# Patient Record
Sex: FEMALE | Race: WHITE | NOT HISPANIC OR LATINO | URBAN - METROPOLITAN AREA
[De-identification: names, ages, dates, MRNs, and addresses within clinical notes are randomized per-mention and may not be internally consistent; named-entity substitution may affect disease eponyms.]

---
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
## 2023-04-26 ENCOUNTER — APPOINTMENT (OUTPATIENT)
Dept: URGENT CARE | Facility: CLINIC | Age: 23
End: 2023-04-26

## 2025-03-12 ENCOUNTER — OFFICE VISIT (OUTPATIENT)
Dept: URGENT CARE | Facility: CLINIC | Age: 25
End: 2025-03-12
Payer: COMMERCIAL

## 2025-03-12 VITALS
WEIGHT: 134 LBS | RESPIRATION RATE: 18 BRPM | SYSTOLIC BLOOD PRESSURE: 108 MMHG | HEART RATE: 82 BPM | HEIGHT: 66 IN | BODY MASS INDEX: 21.53 KG/M2 | OXYGEN SATURATION: 98 % | TEMPERATURE: 98.3 F | DIASTOLIC BLOOD PRESSURE: 72 MMHG

## 2025-03-12 DIAGNOSIS — J02.9 PHARYNGITIS, UNSPECIFIED ETIOLOGY: ICD-10-CM

## 2025-03-12 LAB — S PYO DNA SPEC NAA+PROBE: NOT DETECTED

## 2025-03-12 PROCEDURE — 87651 STREP A DNA AMP PROBE: CPT

## 2025-03-12 PROCEDURE — 3078F DIAST BP <80 MM HG: CPT

## 2025-03-12 PROCEDURE — 99213 OFFICE O/P EST LOW 20 MIN: CPT

## 2025-03-12 PROCEDURE — 3074F SYST BP LT 130 MM HG: CPT

## 2025-03-12 ASSESSMENT — ENCOUNTER SYMPTOMS
FEVER: 0
EYE DISCHARGE: 0
WEAKNESS: 0
SORE THROAT: 1
MYALGIAS: 0
HEADACHES: 0
DIAPHORESIS: 0
DIARRHEA: 0
DIZZINESS: 0
SHORTNESS OF BREATH: 0
NAUSEA: 0
SINUS PAIN: 0
BLOOD IN STOOL: 0
CHILLS: 0
SPUTUM PRODUCTION: 0
WHEEZING: 0
VOMITING: 0
BLURRED VISION: 0
COUGH: 0
PSYCHIATRIC NEGATIVE: 1
ABDOMINAL PAIN: 0

## 2025-03-12 NOTE — PATIENT INSTRUCTIONS
-A cough can persist for up to 6 weeks.  -Increase fluids.  -Eat whole foods that are high in vitamins and minerals to aid in healing.  -REST! REST! Rest! Let your body rest so it can heal.   -Cool-mist humidifier for nighttime use.   -Practice good hand hygiene.  -You may use either acetaminophen or ibuprofen over-the-counter every 6-8 hours for pain or fever if that is not contraindicated with your body.   -Chloraseptic lozenge, warm tea with honey or throat spray to help with discomfort.   -Zinc 25 mg has been shown to be effective in reducing the duration of cold symptoms.  -Saline Nasal Spray and Flonase may be used for congestion. Nasal saline in the nose helps to help break up nasal secretions and is beneficial for nasal symptoms and throat pain.  -Saline Nasal Rinse with a Neti pot or Long med rinse can be used multiple times a day. Be sure to use distilled water or boil tap water for 10 mins. Add a saline packet. Be sure the water is not too hot (around your body temp of 98 degrees)  -Decongestants are not recommended as they can have a rebound congestion effect along with many side effects (especially if you have high blood pressure).   -Salt water gargles for sore throat several times a day.

## 2025-03-12 NOTE — LETTER
March 12, 2025         Patient: Maranda Khan   YOB: 2000   Date of Visit: 3/12/2025           To Whom it May Concern:    Maranda Khan was seen in my clinic on 3/12/2025. She may return to work on 3/14/25.    If you have any questions or concerns, please don't hesitate to call.        Sincerely,           JAMIL Rivas.  Electronically Signed

## 2025-03-12 NOTE — PROGRESS NOTES
"Subjective:   Maranda Khan is a 25 y.o. female who presents for Sore Throat (In contact with someone who had strep )      HPI  Patient was exposed to strep 5 days ago and now has sore throat for three days  Has tried tylenol with mild relief     Negative: muffled voice, drooling, post nasal drip, vision changes, recent travel         Review of Systems   Constitutional:  Negative for chills, diaphoresis, fever and malaise/fatigue.   HENT:  Positive for sore throat. Negative for congestion, ear pain and sinus pain.    Eyes:  Negative for blurred vision and discharge.   Respiratory:  Negative for cough, sputum production, shortness of breath and wheezing.    Cardiovascular:  Negative for chest pain.   Gastrointestinal:  Negative for abdominal pain, blood in stool, diarrhea, nausea and vomiting.   Genitourinary: Negative.    Musculoskeletal:  Negative for myalgias.   Skin:  Negative for rash.   Neurological:  Negative for dizziness, weakness and headaches.   Endo/Heme/Allergies: Negative.    Psychiatric/Behavioral: Negative.     All other systems reviewed and are negative.      Medical History:  History reviewed. No pertinent past medical history.    Allergies:  Allergies   Allergen Reactions    Amoxicillin     Nitrofurantoin Hives    Penicillins Hives, Itching, Rash and Swelling       Social history, surgical history, medications, and current problem list reviewed today in Epic.       Objective:     /72 (BP Location: Left arm, Patient Position: Sitting, BP Cuff Size: Adult)   Pulse 82   Temp 36.8 °C (98.3 °F) (Temporal)   Resp 18   Ht 1.676 m (5' 6\")   Wt 60.8 kg (134 lb)   SpO2 98%     Physical Exam  Vitals reviewed.   Constitutional:       General: She is not in acute distress.     Appearance: Normal appearance. She is not ill-appearing, toxic-appearing or diaphoretic.   HENT:      Head: Normocephalic.      Jaw: There is normal jaw occlusion.      Right Ear: Tympanic membrane, ear canal and external " ear normal.      Left Ear: Tympanic membrane, ear canal and external ear normal.      Nose: Nose normal. No congestion or rhinorrhea.      Right Sinus: No maxillary sinus tenderness or frontal sinus tenderness.      Left Sinus: No maxillary sinus tenderness or frontal sinus tenderness.      Mouth/Throat:      Lips: Pink.      Mouth: Mucous membranes are moist.      Tongue: Tongue does not deviate from midline.      Pharynx: Oropharynx is clear. Uvula midline. No oropharyngeal exudate, posterior oropharyngeal erythema or uvula swelling.   Eyes:      Extraocular Movements: Extraocular movements intact.      Conjunctiva/sclera: Conjunctivae normal.      Pupils: Pupils are equal, round, and reactive to light.   Cardiovascular:      Rate and Rhythm: Normal rate and regular rhythm.      Pulses: Normal pulses.      Heart sounds: Normal heart sounds.   Pulmonary:      Effort: Pulmonary effort is normal.      Breath sounds: Normal breath sounds.   Abdominal:      General: Abdomen is flat.      Palpations: Abdomen is soft.      Tenderness: There is no abdominal tenderness.   Musculoskeletal:         General: Normal range of motion.      Cervical back: Normal range of motion and neck supple.   Lymphadenopathy:      Cervical: No cervical adenopathy.   Skin:     General: Skin is warm.      Capillary Refill: Capillary refill takes less than 2 seconds.   Neurological:      General: No focal deficit present.      Mental Status: She is alert and oriented to person, place, and time.   Psychiatric:         Mood and Affect: Mood normal.         Behavior: Behavior normal.         Assessment/Plan:       Diagnosis and associated orders:     1. Pharyngitis, unspecified etiology  POCT CEPHEID GROUP A STREP - PCR           Comments/MDM:   I personally reviewed prior external notes and test results pertinent to today's visit as well as additional imaging and testing completed in clinic today.     Very pleasant 25-year-old afebrile,  hemodynamically stable, generally well-appearing female, presenting with complaints of sore throat and strep throat exposure.  Normal pulmonary exam, no concern for pneumonia or bronchitis.  Normal ENT exam  no concern for acute otitis media or bacterial sinus infection.  In clinic strep test was negative   They were encouraged to increase fluids and rest, cool-mist humidifier, saline nasal rinse with distilled water, cough/throat drops, salt water gargles, tylenol or ibuprofen for fever and/or bodyaches.  At this time I do not believe antibiotics would improve patient's symptoms.  They were encouraged to follow-up with the clinic in 48 hours for re-evaluation as needed.  They were given strict instructions to follow up with the emergency room if they develop worsening symptoms and they verbalized understanding.  Patient is clinically stable at today's acute urgent care visit. Vital signs are normal and reassuring.  No acute distress noted. Appropriate for outpatient management at this time. No red flag warnings noted.  Patient given strict instructions to follow up with emergency room if they develop any red flag warnings which were discussed in depth.  They verbalized understanding. Differential diagnosis, natural history, supportive care, and indications for immediate follow-up discussed. All questions answered. They agree with the plan of care.      Please note that this dictation was created using voice recognition software. I have made every reasonable attempt to correct obvious errors, but I expect that there are errors of grammar and possibly content that I did not discover before finalizing the note.

## 2025-06-26 ENCOUNTER — HOSPITAL ENCOUNTER (OUTPATIENT)
Facility: MEDICAL CENTER | Age: 25
End: 2025-06-26
Payer: COMMERCIAL

## 2025-06-26 ENCOUNTER — OFFICE VISIT (OUTPATIENT)
Dept: URGENT CARE | Facility: PHYSICIAN GROUP | Age: 25
End: 2025-06-26
Payer: COMMERCIAL

## 2025-06-26 VITALS
BODY MASS INDEX: 23.24 KG/M2 | TEMPERATURE: 99 F | SYSTOLIC BLOOD PRESSURE: 92 MMHG | DIASTOLIC BLOOD PRESSURE: 50 MMHG | RESPIRATION RATE: 12 BRPM | HEART RATE: 77 BPM | WEIGHT: 144 LBS | OXYGEN SATURATION: 99 %

## 2025-06-26 DIAGNOSIS — R10.2 SUPRAPUBIC PAIN, ACUTE: ICD-10-CM

## 2025-06-26 DIAGNOSIS — N76.0 ACUTE VAGINITIS: ICD-10-CM

## 2025-06-26 DIAGNOSIS — N76.0 GARDNERELLA VAGINALIS INFECTION: Primary | ICD-10-CM

## 2025-06-26 DIAGNOSIS — B96.89 GARDNERELLA VAGINALIS INFECTION: Primary | ICD-10-CM

## 2025-06-26 LAB
APPEARANCE UR: ABNORMAL
BILIRUB UR STRIP-MCNC: ABNORMAL MG/DL
CANDIDA DNA VAG QL PROBE+SIG AMP: NEGATIVE
COLOR UR AUTO: YELLOW
G VAGINALIS DNA VAG QL PROBE+SIG AMP: POSITIVE
GLUCOSE UR STRIP.AUTO-MCNC: ABNORMAL MG/DL
KETONES UR STRIP.AUTO-MCNC: ABNORMAL MG/DL
LEUKOCYTE ESTERASE UR QL STRIP.AUTO: ABNORMAL
NITRITE UR QL STRIP.AUTO: ABNORMAL
PH UR STRIP.AUTO: 8.5 [PH] (ref 5–8)
POCT INT CON NEG: NEGATIVE
POCT INT CON POS: POSITIVE
POCT URINE PREGNANCY TEST: NEGATIVE
PROT UR QL STRIP: ABNORMAL MG/DL
RBC UR QL AUTO: ABNORMAL
SP GR UR STRIP.AUTO: 1.01
T VAGINALIS DNA VAG QL PROBE+SIG AMP: NEGATIVE
UROBILINOGEN UR STRIP-MCNC: 0.2 MG/DL

## 2025-06-26 PROCEDURE — 81002 URINALYSIS NONAUTO W/O SCOPE: CPT

## 2025-06-26 PROCEDURE — 87480 CANDIDA DNA DIR PROBE: CPT

## 2025-06-26 PROCEDURE — 87591 N.GONORRHOEAE DNA AMP PROB: CPT

## 2025-06-26 PROCEDURE — 87491 CHLMYD TRACH DNA AMP PROBE: CPT

## 2025-06-26 PROCEDURE — 87077 CULTURE AEROBIC IDENTIFY: CPT

## 2025-06-26 PROCEDURE — 87510 GARDNER VAG DNA DIR PROBE: CPT

## 2025-06-26 PROCEDURE — 3078F DIAST BP <80 MM HG: CPT

## 2025-06-26 PROCEDURE — 87086 URINE CULTURE/COLONY COUNT: CPT

## 2025-06-26 PROCEDURE — 87660 TRICHOMONAS VAGIN DIR PROBE: CPT

## 2025-06-26 PROCEDURE — 81025 URINE PREGNANCY TEST: CPT

## 2025-06-26 PROCEDURE — 99214 OFFICE O/P EST MOD 30 MIN: CPT

## 2025-06-26 PROCEDURE — 3074F SYST BP LT 130 MM HG: CPT

## 2025-06-26 ASSESSMENT — ENCOUNTER SYMPTOMS
ABDOMINAL PAIN: 1
CARDIOVASCULAR NEGATIVE: 1
RESPIRATORY NEGATIVE: 1
MYALGIAS: 0
FLANK PAIN: 0
FEVER: 0
CHILLS: 0

## 2025-06-26 NOTE — PROGRESS NOTES
Subjective:   Chief Complaint  Maranda Khan is a 25 y.o. female who presents for Vaginitis (Started 10 days ago) and Abdominal Pain      History of Present Illness  Patient presents with complaints of suprapubic pain and different smelling vaginal discharge for the past 4 days.  She states 10 days ago she self diagnosed with a yeast infection and took an over-the-counter Monistat pill.  She states her symptoms resolved after treatment but about 4 days ago she developed the suprapubic pain that radiates to the left and right along with vaginal discharge that is abnormal for her.  She denies bodyaches, chills, fevers.  Denies dysuria.  Denies flank pain.        Review of Systems  Review of Systems   Constitutional:  Negative for chills and fever.   HENT: Negative.     Respiratory: Negative.     Cardiovascular: Negative.    Gastrointestinal:  Positive for abdominal pain.   Genitourinary:  Negative for dysuria, flank pain and hematuria.   Musculoskeletal:  Negative for myalgias.   Skin:  Positive for itching. Negative for rash.       Past Medical History  Past Medical History[1]    Family History  Family History   Problem Relation Age of Onset    Non-contributory Mother     Non-contributory Father        Social History  Social History[2]    Surgical History  Past Surgical History[3]    Current Medications  Home Medications       Reviewed by Daniel Bazan'radha (Medical Assistant) on 06/26/25 at 1029  Med List Status: <None>     Medication Last Dose Status        Patient Sal Taking any Medications                           Allergies  Allergies[4]       Objective:     BP 92/50 (BP Location: Left arm, Patient Position: Sitting, BP Cuff Size: Adult)   Pulse 77   Temp 37.2 °C (99 °F) (Temporal)   Resp 12   Wt 65.3 kg (144 lb)   SpO2 99%     Physical Exam  Constitutional:       Appearance: Normal appearance. She is normal weight.   HENT:      Head: Normocephalic and atraumatic.      Right Ear: Tympanic  membrane, ear canal and external ear normal.      Left Ear: Tympanic membrane, ear canal and external ear normal.      Nose: Nose normal.      Mouth/Throat:      Mouth: Mucous membranes are moist.      Pharynx: Oropharynx is clear.   Eyes:      Conjunctiva/sclera: Conjunctivae normal.      Pupils: Pupils are equal, round, and reactive to light.   Cardiovascular:      Rate and Rhythm: Normal rate and regular rhythm.      Pulses: Normal pulses.      Heart sounds: Normal heart sounds.   Pulmonary:      Effort: Pulmonary effort is normal.      Breath sounds: Normal breath sounds.   Abdominal:      General: Abdomen is flat. Bowel sounds are normal.      Palpations: Abdomen is soft.   Skin:     General: Skin is warm and dry.      Capillary Refill: Capillary refill takes less than 2 seconds.   Neurological:      General: No focal deficit present.      Mental Status: She is alert and oriented to person, place, and time. Mental status is at baseline.   Psychiatric:         Mood and Affect: Mood normal.         Behavior: Behavior normal.         Thought Content: Thought content normal.         Judgment: Judgment normal.       Results for orders placed or performed in visit on 06/26/25   POCT Urinalysis    Collection Time: 06/26/25 11:05 AM   Result Value Ref Range    POC Color yellow Negative    POC Appearance cloudy Negative    POC Glucose neg Negative mg/dL    POC Bilirubin neg Negative mg/dL    POC Ketones neg Negative mg/dL    POC Specific Gravity 1.015 <1.005 - >1.030    POC Blood neg Negative    POC Urine PH 8.5 (A) 5.0 - 8.0    POC Protein neg Negative mg/dL    POC Urobiligen 0.2 Negative (0.2) mg/dL    POC Nitrites neg Negative    POC Leukocyte Esterase trace Negative   POCT PREGNANCY    Collection Time: 06/26/25 11:10 AM   Result Value Ref Range    POC Urine Pregnancy Test Negative     Internal Control Positive Positive     Internal Control Negative Negative          Assessment/Plan:     Diagnosis  1. Suprapubic  pain, acute  - POCT Urinalysis  - POCT PREGNANCY  - URINE CULTURE(NEW); Future  - VAGINAL PATHOGENS DNA PANEL; Future  - Chlamydia/GC, PCR (Urine); Future    2. Acute vaginitis  - POCT Urinalysis  - POCT PREGNANCY  - URINE CULTURE(NEW); Future  - VAGINAL PATHOGENS DNA PANEL; Future  - Chlamydia/GC, PCR (Urine); Future        MDM/Plan/Discussion  The patient's physical exam is benign and reassuring.  She will be tested for chlamydia and gonorrhea at her request.  She will also be tested for a fungal and yeast infection as well as a urinary tract infection.  Her tests will be discussed with her once they are resulted.  She was educated on signs and symptoms to monitor for prompt her to return to  or seek immediate medical attention at the nearest ED for further evaluation.  The patient is agreeable to this plan verbalized understanding.        Shared decision-making was utilized with patient for treatment plan. Medication discussed included indication for use and the potential benefits and side effects. Education was provided regarding the aforementioned assessments.  Differential Diagnosis, natural history, and supportive care discussed. All of the patient's questions were answered to their satisfaction at the time of discharge. Patient was encouraged to monitor symptoms closely. Those signs and symptoms which would warrant concern and mandate seeking a higher level of service through the emergency department discussed at length.  Patient stated agreement and understanding of this plan of care.    Advised the patient to follow-up with the primary care physician for recheck, reevaluation, and consideration of further management.    Please note that this dictation was created using voice recognition software. I have made a reasonable attempt to correct obvious errors, but I expect that there are errors of grammar and possibly content that I did not discover before finalizing the note.    This note was electronically  signed by JH Montalvo         [1] No past medical history on file.  [2]   Social History  Socioeconomic History    Marital status: Single   Tobacco Use    Smoking status: Never   [3] No past surgical history on file.  [4]   Allergies  Allergen Reactions    Amoxicillin     Nitrofurantoin Hives    Penicillins Hives, Itching, Rash and Swelling

## 2025-06-27 LAB
C TRACH DNA SPEC QL NAA+PROBE: NEGATIVE
N GONORRHOEA DNA SPEC QL NAA+PROBE: NEGATIVE
SPECIMEN SOURCE: NORMAL

## 2025-06-27 RX ORDER — METRONIDAZOLE 500 MG/1
500 TABLET ORAL EVERY 12 HOURS
Qty: 14 TABLET | Refills: 0 | Status: SHIPPED | OUTPATIENT
Start: 2025-06-27 | End: 2025-07-04

## 2025-06-28 LAB
BACTERIA UR CULT: ABNORMAL
BACTERIA UR CULT: ABNORMAL
SIGNIFICANT IND 70042: ABNORMAL
SITE SITE: ABNORMAL
SOURCE SOURCE: ABNORMAL